# Patient Record
Sex: MALE | URBAN - METROPOLITAN AREA
[De-identification: names, ages, dates, MRNs, and addresses within clinical notes are randomized per-mention and may not be internally consistent; named-entity substitution may affect disease eponyms.]

---

## 2024-02-01 ENCOUNTER — MEDICAL CORRESPONDENCE (OUTPATIENT)
Dept: HEALTH INFORMATION MANAGEMENT | Facility: CLINIC | Age: 19
End: 2024-02-01

## 2024-02-15 ENCOUNTER — TRANSCRIBE ORDERS (OUTPATIENT)
Dept: OTHER | Age: 19
End: 2024-02-15

## 2024-02-15 DIAGNOSIS — R55 SYNCOPE AND COLLAPSE: ICD-10-CM

## 2024-02-15 DIAGNOSIS — Z41.9 PATIENT-REQUESTED PROCEDURE: Primary | ICD-10-CM

## 2024-02-16 ENCOUNTER — TELEPHONE (OUTPATIENT)
Dept: PEDIATRIC CARDIOLOGY | Facility: CLINIC | Age: 19
End: 2024-02-16

## 2024-02-16 ENCOUNTER — TELEPHONE (OUTPATIENT)
Dept: CARDIOLOGY | Facility: CLINIC | Age: 19
End: 2024-02-16

## 2024-02-16 ENCOUNTER — DOCUMENTATION ONLY (OUTPATIENT)
Dept: CARDIOLOGY | Facility: CLINIC | Age: 19
End: 2024-02-16

## 2024-02-16 DIAGNOSIS — I42.8 LEFT VENTRICULAR NONCOMPACTION (H): Primary | ICD-10-CM

## 2024-02-16 NOTE — TELEPHONE ENCOUNTER
Spoke with Eitan regarding appt for ILR extraction, patients states he has an appointment at [facility] set up next month and no longer will need this appointment.

## 2024-02-16 NOTE — TELEPHONE ENCOUNTER
Date: 2/16/2024    Time of Call: 3:39 PM    Diagnosis: Left ventricular noncompaction (LVNC)    [ TORB ] Ordering provider: Kalpana Newell  Order: Establish care with Kalpana Newell with device check prior.     Order received by: Agustin Espinal RN      Follow-up/additional notes:     Referred by  Joseluis Donohue MD  [facility]  [address] [place] [address]  Phone: [phone]    Outgoing Referral Reason: EP provider that placed the Reveal has left [facility]. For patient care routing to [facility]  to expedit removal     Prior Testing:   *CMR 11/23/22 Imaging requested with overead: From [facility]   *Echo 11/16/22 Images requested: From [facility]

## 2024-02-16 NOTE — PROGRESS NOTES
Action 2-16 [facility] Imaging Requested:   Action Taken Cardiac MRI Images  ECHOI Images 11-23-22 11-16-22